# Patient Record
Sex: FEMALE | Race: WHITE | NOT HISPANIC OR LATINO | URBAN - METROPOLITAN AREA
[De-identification: names, ages, dates, MRNs, and addresses within clinical notes are randomized per-mention and may not be internally consistent; named-entity substitution may affect disease eponyms.]

---

## 2020-07-06 ENCOUNTER — EMERGENCY (EMERGENCY)
Facility: HOSPITAL | Age: 25
LOS: 1 days | Discharge: HOME | End: 2020-07-06
Attending: EMERGENCY MEDICINE
Payer: COMMERCIAL

## 2020-07-06 VITALS
OXYGEN SATURATION: 99 % | TEMPERATURE: 98 F | RESPIRATION RATE: 18 BRPM | HEART RATE: 77 BPM | DIASTOLIC BLOOD PRESSURE: 77 MMHG | SYSTOLIC BLOOD PRESSURE: 128 MMHG

## 2020-07-06 VITALS
SYSTOLIC BLOOD PRESSURE: 130 MMHG | WEIGHT: 164.91 LBS | DIASTOLIC BLOOD PRESSURE: 86 MMHG | TEMPERATURE: 99 F | HEIGHT: 63 IN | OXYGEN SATURATION: 99 % | RESPIRATION RATE: 18 BRPM | HEART RATE: 89 BPM

## 2020-07-06 PROCEDURE — 99284 EMERGENCY DEPT VISIT MOD MDM: CPT

## 2020-07-06 PROCEDURE — 99284 EMERGENCY DEPT VISIT MOD MDM: CPT | Mod: 25

## 2020-07-06 PROCEDURE — 12051 INTMD RPR FACE/MM 2.5 CM/<: CPT

## 2020-07-06 NOTE — ED PROVIDER NOTE - CARE PROVIDER_API CALL
Karissa Pak  PLASTIC SURGERY  [address] [place] [address]  Phone: [phone]  Fax: [phone]  Follow Up Time:

## 2020-07-06 NOTE — ED ADULT NURSE NOTE - NSIMPLEMENTINTERV_GEN_ALL_ED
Implemented All Universal Safety Interventions:  [place] to call system. Call bell, personal items and telephone within reach. Instruct patient to call for assistance. Room bathroom lighting operational. Non-slip footwear when patient is off stretcher. Physically safe environment: no spills, clutter or unnecessary equipment. Stretcher in lowest position, wheels locked, appropriate side rails in place.
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
## 2020-07-06 NOTE — ED PROVIDER NOTE - PATIENT PORTAL LINK FT
You can access the FollowMyHealth Patient Portal offered by [facility] by registering at the following website: [email or web address]. By joining [employer]’s FollowMyHealth portal, you will also be able to view your health information using other applications (apps) compatible with our system.
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
## 2020-07-06 NOTE — ED PROCEDURE NOTE - PROCEDURE ADDITIONAL DETAILS
running intra-cuticular suture and running over and over 4-0 proline as well as several interrupted prolines  Intra oral stellate avulsion laceration-sharply debrided and orbicularis oris muscle repair with 4-0 vicryl followed by running over and over 4-0 vicryl  Laceration of forehead dressed with bacitracin and sterile gauze  Instructions given-including those for intra-oral care  PO antibiotics and pain medication  F/U in one week in office
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
## 2020-07-06 NOTE — ED PROCEDURE NOTE - GENERAL PROCEDURE DETAILS
Repair of frontalis muscle disruption with a superiorly based and a second inferiorly based muscle flap-advanced to one another and imbricated upon each other in vest over pants fashion-anchored to one another with interrupted 4-o vicryl  Debridement of skin and subcutaneous tissue to lessen the [id number] nature of the forehead laceration- advancement flap closure with reconstitution of the anatomic sublayers-subQ and deep dermis-3-0 monocryl, dermis-interrupted 3-0 monocryl, 4-0 monocryl
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
## 2020-07-06 NOTE — ED PROVIDER NOTE - CLINICAL SUMMARY MEDICAL DECISION MAKING FREE TEXT BOX
25 y/o F pt presenting with lip laceration sustained 2 days pta. Pt told by plastics to come to ED to be fixed, Will page dr. morales with plastics and reeval.
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
## 2020-07-06 NOTE — ED PROVIDER NOTE - PROGRESS NOTE DETAILS
Dr. Gustafson with plastics paged. - Aniceto Lynne, PGY-2 Plastics here repairing pt's lac. Pt tolerated well and  is comfortable with plan for d/c. Pt agrees to f/u with Dr. Gustafson. Return instructions given, questions answered.
